# Patient Record
Sex: FEMALE | Race: OTHER | NOT HISPANIC OR LATINO | URBAN - METROPOLITAN AREA
[De-identification: names, ages, dates, MRNs, and addresses within clinical notes are randomized per-mention and may not be internally consistent; named-entity substitution may affect disease eponyms.]

---

## 2017-07-20 ENCOUNTER — APPOINTMENT (RX ONLY)
Dept: URBAN - METROPOLITAN AREA CLINIC 53 | Facility: CLINIC | Age: 37
Setting detail: DERMATOLOGY
End: 2017-07-20

## 2017-07-20 DIAGNOSIS — L70.0 ACNE VULGARIS: ICD-10-CM

## 2017-07-20 DIAGNOSIS — L72.0 EPIDERMAL CYST: ICD-10-CM

## 2017-07-20 DIAGNOSIS — Z41.9 ENCOUNTER FOR PROCEDURE FOR PURPOSES OTHER THAN REMEDYING HEALTH STATE, UNSPECIFIED: ICD-10-CM

## 2017-07-20 PROCEDURE — ? CHEMICAL PEEL JESSNER/TCA

## 2017-07-20 PROCEDURE — ? COUNSELING

## 2017-07-20 PROCEDURE — 11900 INJECT SKIN LESIONS </W 7: CPT

## 2017-07-20 PROCEDURE — ? TREATMENT REGIMEN

## 2017-07-20 PROCEDURE — 99203 OFFICE O/P NEW LOW 30 MIN: CPT | Mod: 25

## 2017-07-20 PROCEDURE — ? PRESCRIPTION

## 2017-07-20 PROCEDURE — ? INTRALESIONAL KENALOG

## 2017-07-20 RX ORDER — SPIRONOLACTONE 50 MG/1
TABLET, FILM COATED ORAL
Qty: 60 | Refills: 0 | Status: ERX | COMMUNITY
Start: 2017-07-20

## 2017-07-20 RX ADMIN — SPIRONOLACTONE: 50 TABLET, FILM COATED ORAL at 00:00

## 2017-07-20 ASSESSMENT — LOCATION DETAILED DESCRIPTION DERM
LOCATION DETAILED: LEFT CENTRAL MALAR CHEEK
LOCATION DETAILED: LEFT INFERIOR CENTRAL MALAR CHEEK
LOCATION DETAILED: RIGHT CENTRAL MALAR CHEEK
LOCATION DETAILED: RIGHT INFERIOR CENTRAL MALAR CHEEK

## 2017-07-20 ASSESSMENT — LOCATION ZONE DERM: LOCATION ZONE: FACE

## 2017-07-20 ASSESSMENT — LOCATION SIMPLE DESCRIPTION DERM
LOCATION SIMPLE: LEFT CHEEK
LOCATION SIMPLE: RIGHT CHEEK

## 2017-07-20 NOTE — PROCEDURE: INTRALESIONAL KENALOG
Ndc# For Kenalog Only: 6226-7977-01
Total Volume Injected (Ccs- Only Use Numbers And Decimals): 0.3
Lot # (Optional): -[id number]
Medical Necessity Clause: This procedure was medically necessary because the lesions that were treated were:
Expiration Date (Optional): [id number]
Kenalog Preparation: Kenalog
Consent: The risks of atrophy were reviewed with the patient.
Detail Level: Zone
Include Z78.9 (Other Specified Conditions Influencing Health Status) As An Associated Diagnosis?: No
X Size Of Lesion In Cm (Optional): 0
Concentration Of Solution Injected (Mg/Ml): 3.0

## 2017-07-20 NOTE — PROCEDURE: CHEMICAL PEEL JESSNER/TCA
Prep: The treated area was degreased with pre-peel cleanser, and vaseline was applied for protection of mucous membranes.
Number Of Coats: 2
Chemical Peel: 10% TCA
Post-Care Instructions: I reviewed with the patient in detail post-care instructions. Patient should avoid sun exposure and wear sun protection.
Post Peel Care: After the procedure, the treatment area was washed with soap and water, and a post-peel cream was applied. Sun protection and post-care instructions were reviewed with the patient.
Detail Level: Zone
Treatment Number: 0
Erythema: mild
Consent: Prior to the procedure, written consent was obtained and risks were reviewed, including but not limited to: redness, peeling, blistering, pigmentary change, scarring, infection, and pain.

## 2017-07-20 NOTE — PROCEDURE: TREATMENT REGIMEN
Detail Level: Detailed
Initiate Treatment: Use medications as directed, microdermabrasion, TCA and follow up as directed

## 2017-07-21 RX ORDER — HYDROCORTISONE 25 MG/G
CREAM TOPICAL
Qty: 1 | Refills: 0 | Status: ERX | COMMUNITY
Start: 2017-07-21

## 2017-07-21 RX ORDER — CLINDAMYCIN HYDROCHLORIDE 300 MG/1
CAPSULE ORAL
Qty: 10 | Refills: 0 | Status: ERX | COMMUNITY
Start: 2017-07-21

## 2017-07-21 RX ORDER — HYDROQUINONE 20 MG/G
CREAM TOPICAL
Qty: 1 | Refills: 0 | Status: ERX | COMMUNITY
Start: 2017-07-21

## 2017-07-21 RX ADMIN — HYDROCORTISONE: 25 CREAM TOPICAL at 00:00

## 2017-07-21 RX ADMIN — HYDROQUINONE: 20 CREAM TOPICAL at 00:00

## 2017-07-21 RX ADMIN — CLINDAMYCIN HYDROCHLORIDE: 300 CAPSULE ORAL at 00:00

## 2017-08-03 ENCOUNTER — APPOINTMENT (RX ONLY)
Dept: URBAN - METROPOLITAN AREA CLINIC 53 | Facility: CLINIC | Age: 37
Setting detail: DERMATOLOGY
End: 2017-08-03

## 2017-08-03 DIAGNOSIS — L70.0 ACNE VULGARIS: ICD-10-CM

## 2017-08-03 PROCEDURE — 10040 EXTRACTION: CPT

## 2017-08-03 PROCEDURE — ? ACNE SURGERY

## 2017-08-03 ASSESSMENT — LOCATION ZONE DERM: LOCATION ZONE: FACE

## 2017-08-03 ASSESSMENT — LOCATION DETAILED DESCRIPTION DERM: LOCATION DETAILED: RIGHT SUPERIOR FOREHEAD

## 2017-08-03 ASSESSMENT — LOCATION SIMPLE DESCRIPTION DERM: LOCATION SIMPLE: RIGHT FOREHEAD

## 2017-08-03 NOTE — PROCEDURE: ACNE SURGERY
Render Post-Care Instructions In Note?: yes
Prep Text (Optional): Prior to extraction skin of the face was cleansed under steam using lactifoam cleanser
Detail Level: Zone
Post-Care Instructions: I reviewed with the patient in detail post-care instructions. Patient is to keep the treatment areaas dry overnight, and then apply bacitracin twice daily until healed. Patient may apply hydrogen peroxide soaks to remove any crusting.
Consent was obtained and risks were reviewed including but not limited to scarring, infection, bleeding, scabbing, incomplete removal, and allergy to anesthesia.
Hemostasis: Pressure
Extraction Method: cotton-tipped applicators and gentle pressure
Acne Type: Comedonal Lesions
Render Number Of Lesions Treated: no

## 2017-09-21 ENCOUNTER — APPOINTMENT (RX ONLY)
Dept: URBAN - METROPOLITAN AREA CLINIC 53 | Facility: CLINIC | Age: 37
Setting detail: DERMATOLOGY
End: 2017-09-21

## 2017-09-21 DIAGNOSIS — Z41.9 ENCOUNTER FOR PROCEDURE FOR PURPOSES OTHER THAN REMEDYING HEALTH STATE, UNSPECIFIED: ICD-10-CM

## 2017-09-21 DIAGNOSIS — L70.0 ACNE VULGARIS: ICD-10-CM

## 2017-09-21 PROCEDURE — ? ACNE SURGERY

## 2017-09-21 PROCEDURE — 10040 EXTRACTION: CPT

## 2017-09-21 PROCEDURE — ? MICRODERMABRASION

## 2017-09-21 ASSESSMENT — LOCATION ZONE DERM: LOCATION ZONE: FACE

## 2017-09-21 ASSESSMENT — LOCATION DETAILED DESCRIPTION DERM
LOCATION DETAILED: LEFT INFERIOR LATERAL MALAR CHEEK
LOCATION DETAILED: RIGHT FOREHEAD

## 2017-09-21 ASSESSMENT — LOCATION SIMPLE DESCRIPTION DERM
LOCATION SIMPLE: RIGHT FOREHEAD
LOCATION SIMPLE: LEFT CHEEK

## 2017-09-21 NOTE — PROCEDURE: MICRODERMABRASION
Number Of Passes: 2
Vacuum Pressure: 4
Endpoint: mild erythema
Crystal Flow: Mild
Consent: Written consent obtained, risks reviewed including but not limited to crusting, scabbing, blistering, scarring, darker or lighter pigmentary change, bruising, and/or incomplete response.
Price (Use Numbers Only, No Special Characters Or $): 10
Treatment Number: 1
Indication: pigmentation abnormalities
Detail Level: Zone
Prep Text: The patients skin was cleaned and prepped.
Wand: Medium
Post-Care Instructions: I reviewed with the patient in detail post-care instructions. Patient should stay away from the sun and wear sun protection until treated areas are fully healed.

## 2017-09-21 NOTE — PROCEDURE: ACNE SURGERY
Render Number Of Lesions Treated: yes
Hemostasis: Pressure
Acne Type: Comedonal Lesions
Consent was obtained and risks were reviewed including but not limited to scarring, infection, bleeding, scabbing, incomplete removal, and allergy to anesthesia.
Extraction Method: cotton-tipped applicators and gentle pressure
Prep Text (Optional): Prior to extraction skin of the face was cleansed under steam using lactifoam cleanser
Detail Level: Zone
Post-Care Instructions: I reviewed with the patient in detail post-care instructions. Patient is to keep the treatment areaas dry overnight, and then apply bacitracin twice daily until healed. Patient may apply hydrogen peroxide soaks to remove any crusting.